# Patient Record
Sex: MALE | Race: BLACK OR AFRICAN AMERICAN | URBAN - METROPOLITAN AREA
[De-identification: names, ages, dates, MRNs, and addresses within clinical notes are randomized per-mention and may not be internally consistent; named-entity substitution may affect disease eponyms.]

---

## 2019-08-10 LAB
CREATININE, EXTERNAL: 1.44
LDL-C, EXTERNAL: 22

## 2019-08-29 ENCOUNTER — OFFICE VISIT (OUTPATIENT)
Dept: ENDOCRINOLOGY | Age: 54
End: 2019-08-29

## 2019-08-29 VITALS
OXYGEN SATURATION: 99 % | RESPIRATION RATE: 14 BRPM | HEIGHT: 66 IN | SYSTOLIC BLOOD PRESSURE: 143 MMHG | HEART RATE: 76 BPM | DIASTOLIC BLOOD PRESSURE: 81 MMHG | BODY MASS INDEX: 22.18 KG/M2 | TEMPERATURE: 96.2 F | WEIGHT: 138 LBS

## 2019-08-29 DIAGNOSIS — E11.65 TYPE 2 DIABETES MELLITUS WITH HYPERGLYCEMIA, UNSPECIFIED WHETHER LONG TERM INSULIN USE (HCC): Primary | ICD-10-CM

## 2019-08-29 DIAGNOSIS — N18.30 CHRONIC KIDNEY DISEASE (CKD), STAGE III (MODERATE) (HCC): ICD-10-CM

## 2019-08-29 DIAGNOSIS — I10 ESSENTIAL HYPERTENSION: ICD-10-CM

## 2019-08-29 DIAGNOSIS — K86.1 CHRONIC PANCREATITIS, UNSPECIFIED PANCREATITIS TYPE (HCC): ICD-10-CM

## 2019-08-29 RX ORDER — GLUCAGON 1 MG
VIAL (EA) INJECTION
Refills: 1 | COMMUNITY
Start: 2019-07-11

## 2019-08-29 RX ORDER — FLUDROCORTISONE ACETATE 0.1 MG/1
TABLET ORAL
Refills: 1 | COMMUNITY
Start: 2019-08-15

## 2019-08-29 RX ORDER — INSULIN LISPRO 100 [IU]/ML
INJECTION, SOLUTION INTRAVENOUS; SUBCUTANEOUS
Refills: 4 | COMMUNITY
Start: 2019-07-03 | End: 2019-08-29 | Stop reason: SDUPTHER

## 2019-08-29 RX ORDER — PANCRELIPASE 36000; 180000; 114000 [USP'U]/1; [USP'U]/1; [USP'U]/1
CAPSULE, DELAYED RELEASE PELLETS ORAL
Refills: 2 | COMMUNITY
Start: 2019-07-24

## 2019-08-29 RX ORDER — GABAPENTIN 300 MG/1
CAPSULE ORAL 4 TIMES DAILY
COMMUNITY
Start: 2019-08-03

## 2019-08-29 RX ORDER — INSULIN GLARGINE 100 [IU]/ML
12 INJECTION, SOLUTION SUBCUTANEOUS
COMMUNITY
Start: 2019-08-12

## 2019-08-29 RX ORDER — LANOLIN ALCOHOL/MO/W.PET/CERES
CREAM (GRAM) TOPICAL
Refills: 2 | COMMUNITY
Start: 2019-08-08

## 2019-08-29 RX ORDER — INSULIN LISPRO 100 [IU]/ML
INJECTION, SOLUTION INTRAVENOUS; SUBCUTANEOUS
Qty: 15 ML | Refills: 11 | Status: SHIPPED | OUTPATIENT
Start: 2019-08-29

## 2019-08-29 RX ORDER — CALCITRIOL 0.25 UG/1
CAPSULE ORAL
COMMUNITY
Start: 2019-08-16

## 2019-08-29 RX ORDER — SILDENAFIL 50 MG/1
TABLET, FILM COATED ORAL
Refills: 0 | COMMUNITY
Start: 2019-07-03

## 2019-08-29 RX ORDER — TAMSULOSIN HYDROCHLORIDE 0.4 MG/1
CAPSULE ORAL
Refills: 4 | COMMUNITY
Start: 2019-08-26

## 2019-08-29 RX ORDER — POTASSIUM CHLORIDE 750 MG/1
CAPSULE, EXTENDED RELEASE ORAL
Refills: 1 | COMMUNITY
Start: 2019-07-28

## 2019-08-29 RX ORDER — AMLODIPINE BESYLATE 10 MG/1
TABLET ORAL
Refills: 3 | COMMUNITY
Start: 2019-08-28

## 2019-08-29 RX ORDER — SODIUM BICARBONATE 650 MG/1
TABLET ORAL
Refills: 3 | COMMUNITY
Start: 2019-08-14

## 2019-08-29 NOTE — PATIENT INSTRUCTIONS
Check blood sugars before meals or breakfast and at bedtime. Low blood glucose is less than 70     Goal of blood glucose before meals 90 - 120 , 2  Hours after meals less than 150     Maintain the log and bring it all your appointments    If the bedtime sugars are less than 100 ,eat a 15 gm snack. Lantus or Levemir or Toujeo (long-acting insulin)12 units at bed time    If morning glucose before breakfast is more than 120 , 2 days in a row , go up by 2 units  until your morning sugar is consistently less than 120. Novolog or Humalog or Apidra insulin (fast acting) 4 units before breakfast, 6 units before lunch and 6 units before dinner. If sugars before meals are less than 70 No Humalog     Additional Novolog or Humalog or Apidra  for high blood sugars     150-200 mg   1 units   201-250 mg   2 units   251-300 mg   3 units   301-350 mg   4 units   351-400 mg   5 units         Exercising for 30 minutes at least 5 days per week has been shown to increase the lifespan of diabetics. We encourage an active lifestyle that includes regular exercise. You may benefit from the [facility] at [facility] #[phone]     For diet information go to www. [email or web address]. org     Diabetes is the leading cause of blindness in the [place] It is important that you see an eye doctor every year for a dilated retinal exam     Diabetes is the leading cause of amputations in the [place] It is very important that you keep an eye on the condition of you feet. Look for any cuts, calluses, ulcers, fungal infections, rashes, or nail problems. Diabetics need to be seen several times a year by their physician for  labs and monitoring of their diabetes. Prevention is the key to keeping diabetics out of trouble     Obtain a flu shot each Fall. Aspirin 81 mg is recommended for diabetics older than 40 years . What should you know about eating carbs?   Managing the amount of carbohydrate (carbs) you eat is an important part of healthy meals when you have diabetes. Carbohydrate is found in many foods. · Learn which foods have carbs. And learn the amounts of carbs in different foods. · Bread, cereal, pasta, and rice have about 15 grams of carbs in a serving. A serving is 1 slice of bread (1 ounce), ½ cup of cooked cereal, or 1/3 cup of cooked pasta or rice. · Fruits have 15 grams of carbs in a serving. A serving is 1 small fresh fruit, such as an apple or orange; ½ of a banana; ½ cup of cooked or canned fruit; ½ cup of fruit juice; 1 cup of melon or raspberries; or 2 tablespoons of dried fruit. · Milk and no-sugar-added yogurt have 15 grams of carbs in a serving. A serving is 1 cup of milk or 2/3 cup of no-sugar-added yogurt. · Starchy vegetables have 15 grams of carbs in a serving. A serving is ½ cup of mashed potatoes or sweet potato; 1 cup winter squash; ½ of a small baked potato; ½ cup of cooked beans; or ½ cup cooked corn or green peas. · Learn how much carbs to eat each day and at each meal. A dietitian or CDE can teach you how to keep track of the amount of carbs you eat. This is called carbohydrate counting. · If you are not sure how to count carbohydrate grams, use the Plate Method to plan meals. It is a good, quick way to make sure that you have a balanced meal. It also helps you spread carbs throughout the day. · Divide your plate by types of foods. Put non-starchy vegetables on half the plate, meat or other protein food on one-quarter of the plate, and a grain or starchy vegetable in the final quarter of the plate. To this you can add a small piece of fruit and 1 cup of milk or yogurt, depending on how many carbs you are supposed to eat at a meal.  · Try to eat about the same amount of carbs at each meal. Do not \"save up\" your daily allowance of carbs to eat at one meal.  · Proteins have very little or no carbs per serving.  Examples of proteins are beef, chicken, turkey, fish, eggs, tofu, cheese, cottage cheese, and peanut butter. A serving size of meat is 3 ounces, which is about the size of a deck of cards. Examples of meat substitute serving sizes (equal to 1 ounce of meat) are 1/4 cup of cottage cheese, 1 egg, 1 tablespoon of peanut butter, and ½ cup of tofu. How can you eat out and still eat healthy? · Learn to estimate the serving sizes of foods that have carbohydrate. If you measure food at home, it will be easier to estimate the amount in a serving of restaurant food. · If you eat more carbohydrate at a meal than you had planned, take a walk or do other exercise. This will help lower your blood sugar.

## 2019-08-29 NOTE — PROGRESS NOTES
Leslie Liang is a 46 y.o. male here for   Chief Complaint   Patient presents with    New Patient     referred by Dr. Mitch Cavanaugh for DM       Functional glucose monitor and record keeping system? - yes  Eye exam within last year? -    Foot exam within last year? - due    1. Have you been to the ER, urgent care clinic since your last visit? Hospitalized since your last visit? -n/a    2. Have you seen or consulted any other health care providers outside of the [address] [place] [address] since your last visit?   Include any pap smears or colon screening.-n/a

## 2019-08-29 NOTE — LETTER
8/29/19 Patient: Elizabeth Purcell YOB: 1965 Date of Visit: 8/29/2019 Lennox Loffler, MD 
[address] [address] VIA Facsimile: [phone] Dear Lennox Loffler, MD, Thank you for referring Mr. Baron Hein to [address] [place] [address] for evaluation. My notes for this consultation are attached. If you have questions, please do not hesitate to call me. I look forward to following your patient along with you. Sincerely, Jeremiah Joy MD

## 2019-08-29 NOTE — PROGRESS NOTES
[facility]               Wisam Sommers MD        [address] [address] JN:[phone] Fax [phone]               Patient Information  Date:8/29/2019  Name : Coco Bueno 46 y.o.     YOB: 1965         Referred by: Ino Hutchinson MD         Chief Complaint   Patient presents with   [facility] New Patient     referred by Dr. Phil Scott for DM       History of Present Illness: Coco Bueno is a 46 y.o. male here for initial visit of  Type 2 Diabetes Mellitus. Type 2 diabetes mellitus was diagnosed in 2010, history of pancreatitis likely due to pancreatic divisum, CKD, peripheral neuropathy  He is on Lantus and Humalog. He did not bring the meter, reportedly checking 4 times daily. Fastings less than 130, post lunch and dinner blood glucose is ranging from 200-250  Drinking sodas, diet drinks. Diet is high in carbs. He eats 2 times a day. Underlying CKD  No severe hypoglycemia. Very limited activity. No chest pain, blurred vision, shortness of breath  Complains of tingling, numbness in the feet    Wt Readings from Last 3 Encounters:   08/29/19 138 lb (62.6 kg)       BP Readings from Last 3 Encounters:   08/29/19 143/81           Past Medical History:   Diagnosis Date    CKD (chronic kidney disease)     [facility] mellitus) [facility]     [facility]     Pancreatic cyst      Current Outpatient Medications   Medication Sig    amLODIPine (NORVASC) 10 mg tablet TAKE 1 TABLET BY MOUTH ONCE DAILY    calcitRIOL (ROCALTROL) 0.25 mcg capsule     fludrocortisone (FLORINEF) 0.1 mg tablet TAKE 1 2 (ONE HALF) TABLET BY MOUTH ONCE DAILY    gabapentin (NEURONTIN) 300 mg capsule four (4) times daily.  GLUCAGON EMERGENCY KIT, HUMAN, 1 mg injection INJECT AS DIRECTED FOR LOW SUGAR    LANTUS SOLOSTAR U-100 INSULIN 100 unit/mL (3 mL) inpn 12 Units by SubCUTAneous route nightly.     CREON 36,000-114,000- 180,000 unit cpDR capsule TAKE 1 CAPSULE BY MOUTH THREE TIMES DAILY    magnesium oxide (MAG-OX) 400 mg tablet TAKE 1 TABLET BY MOUTH ONCE DAILY AS NEEDED    potassium chloride SA (MICRO-K) 10 mEq capsule TAKE 1 CAPSULE BY MOUTH TWICE DAILY    sildenafil citrate (VIAGRA) 50 mg tablet TAKE ONE TABLET BY MOUTH APPROXIMATELY ONE HOUR BEFORE SEXUAL ACTIVITY. DO NOT USE MORE THAN ONE DOSE DAILY DO NOT TAKE WITHIN 6 HOURS OF TA    sodium bicarbonate 650 mg tablet TAKE 2 TABLETS BY MOUTH THREE TIMES DAILY    tamsulosin (FLOMAX) 0.4 mg capsule TAKE 1 CAPSULE BY MOUTH ONCE DAILY AT BEDTIME    HUMALOG KWIKPEN INSULIN 100 unit/mL kwikpen Inject 4 units before breakfast and 6 units before lunch and dinner w/ SSI Max units daily: 31     No current facility-administered medications for this visit. Allergies   Allergen Reactions    Bee Venom Protein (Honey Bee) Anaphylaxis       Review of Systems:  All 10 systems reviewed and are negative other than mentioned in HPI    Physical Examination:   Blood pressure 143/81, pulse 76, temperature 96.2 °F (35.7 °C), temperature source Oral, resp. rate 14, height 5' 6\" (1.676 m), weight 138 lb (62.6 kg), SpO2 99 %. Estimated body mass index is 22.27 kg/m² as calculated from the following:    Height as of this encounter: 5' 6\" (1.676 m). -   Weight as of this encounter: 138 lb (62.6 kg).   - General: pleasant, no distress, good eye contact  - HEENT: no pallor, no periorbital edema, EOMI  - Neck: supple, no thyromegaly, no nodules  - Cardiovascular: regular,  normal S1 and S2, no murmurs  - Respiratory: clear to auscultation bilaterally  - Gastrointestinal: soft, nontender, nondistended,  BS +  - Musculoskeletal: no proximal muscle weakness in upper or lower extremities  - Integumentary: no acanthosis nigricans,no edema,   - Neurological: alert and oriented  - Psychiatric: normal mood and affect  - Skin: color, texture, turgor normal.     Diabetic foot exam: August 2019    Left: Vibratory sensation absent   Filament test decreased sensation with micro filament   Pulse DP: 1+    Deformities: Bunion  Right:    Vibratory sensation absent   Filament test decreased sensation with micro filament   Pulse DP: 1+   Deformities: Bunion        Data Reviewed:       No results found for: HBA1C, SHK2VOQX, HGBE8, GLU, GESTF, GLUCPOC, MCACR, MCA1, MCA2, MCA3, MCAU, LDL, LDLC, DLDLP, RUBY, CREAPOC, ACREA, CREA, REFC3, REFC4, BJW3NJLI, ZCL5TRRD   No results found for: GFRNA, GFRNAPOC, GFRAA, GFRAAPOC, CREA, CREAPOC, BUN, IBUN, BUNPOC, NA, NAPOC, K, KPOCT, CL, CLPOC, CO2, CO2POC, MG, PHOS, ALBEU, PTH, PTHILT, EPO    Assessment/Plan:     1. Type 2 diabetes mellitus with hyperglycemia, unspecified whether long term insulin use ([email or web address]    2. Chronic pancreatitis, unspecified pancreatitis type ([email or web address]    3. Chronic kidney disease (CKD), stage III (moderate) ([facility])    4. Essential hypertension        1. Diabetes Mellitus with  nephropathy,neuropathy  No results found for: HBA1C, HGBE8, ZAD8PGGZ, PER5LQRL, YBD6SJHC   Does not have typical phenotype of type 2 diabetes, pancreatic insufficiency due to pancreatitis  Uncontrolled diabetes mellitus, postprandial hyperglycemia. Discussed about low-carb diet, decrease soft drinks,   Increase activity as tolerated  Continue Lantus  Humalog 4 units before breakfast, 6 units before lunch, 6 units before dinner  Given history of pancreatitis, CKD, limited options   advised to check glucose 2 - 4 times daily    Diabetic issues reviewed : glycemic goals , written exchange diet given, low carbohydrate diet, weight control , home glucose monitoring emphasized,  hypoglycemia management and long term diabetic complications discussed. FLU annually ,Pneumovax ,aspirin daily,annual eye exam,microalbumin    2. HTN : Continue current therapy     3. Reports history of MI, discussed the benefits of statin  Check lipid panel    4. Chronic pancreatitis    5.   Peripheral neuropathy: Continue gabapentin  Stressed the importance of good glycemic control    6. CKD:      Patient Instructions   Check blood sugars before meals or breakfast and at bedtime. Low blood glucose is less than 70     Goal of blood glucose before meals 90 - 120 , 2  Hours after meals less than 150     Maintain the log and bring it all your appointments    If the bedtime sugars are less than 100 ,eat a 15 gm snack. Lantus or Levemir or Toujeo (long-acting insulin)12 units at bed time    If morning glucose before breakfast is more than 120 , 2 days in a row , go up by 2 units  until your morning sugar is consistently less than 120. Novolog or Humalog or Apidra insulin (fast acting) 4 units before breakfast, 6 units before lunch and 6 units before dinner. If sugars before meals are less than 70 No Humalog     Additional Novolog or Humalog or Apidra  for high blood sugars     150-200 mg   1 units   201-250 mg   2 units   251-300 mg   3 units   301-350 mg   4 units   351-400 mg   5 units         Exercising for 30 minutes at least 5 days per week has been shown to increase the lifespan of diabetics. We encourage an active lifestyle that includes regular exercise. You may benefit from the Di[facility] Center at [facility] #[phone]     For diet information go to www. [email or web address] org     Diabetes is the leading cause of blindness in the [place] It is important that you see an eye doctor every year for a dilated retinal exam     Diabetes is the leading cause of amputations in the [place] It is very important that you keep an eye on the condition of you feet. Look for any cuts, calluses, ulcers, fungal infections, rashes, or nail problems. Diabetics need to be seen several times a year by their physician for  labs and monitoring of their diabetes. Prevention is the key to keeping diabetics out of trouble     Obtain a flu shot each Fall. Aspirin 81 mg is recommended for diabetics older than 40 years .       What should you know about eating carbs? Managing the amount of carbohydrate (carbs) you eat is an important part of healthy meals when you have diabetes. Carbohydrate is found in many foods. · Learn which foods have carbs. And learn the amounts of carbs in different foods. · Bread, cereal, pasta, and rice have about 15 grams of carbs in a serving. A serving is 1 slice of bread (1 ounce), ½ cup of cooked cereal, or 1/3 cup of cooked pasta or rice. · Fruits have 15 grams of carbs in a serving. A serving is 1 small fresh fruit, such as an apple or orange; ½ of a banana; ½ cup of cooked or canned fruit; ½ cup of fruit juice; 1 cup of melon or raspberries; or 2 tablespoons of dried fruit. · Milk and no-sugar-added yogurt have 15 grams of carbs in a serving. A serving is 1 cup of milk or 2/3 cup of no-sugar-added yogurt. · Starchy vegetables have 15 grams of carbs in a serving. A serving is ½ cup of mashed potatoes or sweet potato; 1 cup winter squash; ½ of a small baked potato; ½ cup of cooked beans; or ½ cup cooked corn or green peas. · Learn how much carbs to eat each day and at each meal. A dietitian or CDE can teach you how to keep track of the amount of carbs you eat. This is called carbohydrate counting. · If you are not sure how to count carbohydrate grams, use the Plate Method to plan meals. It is a good, quick way to make sure that you have a balanced meal. It also helps you spread carbs throughout the day. · Divide your plate by types of foods. Put non-starchy vegetables on half the plate, meat or other protein food on one-quarter of the plate, and a grain or starchy vegetable in the final quarter of the plate.  To this you can add a small piece of fruit and 1 cup of milk or yogurt, depending on how many carbs you are supposed to eat at a meal.  · Try to eat about the same amount of carbs at each meal. Do not \"save up\" your daily allowance of carbs to eat at one meal.  · Proteins have very little or no carbs per serving. Examples of proteins are beef, chicken, turkey, fish, eggs, tofu, cheese, cottage cheese, and peanut butter. A serving size of meat is 3 ounces, which is about the size of a deck of cards. Examples of meat substitute serving sizes (equal to 1 ounce of meat) are 1/4 cup of cottage cheese, 1 egg, 1 tablespoon of peanut butter, and ½ cup of tofu. How can you eat out and still eat healthy? · Learn to estimate the serving sizes of foods that have carbohydrate. If you measure food at home, it will be easier to estimate the amount in a serving of restaurant food. · If you eat more carbohydrate at a meal than you had planned, take a walk or do other exercise. This will help lower your blood sugar. Follow-up and Dispositions    · Return in about 4 months (around 12/29/2019) for labs before next visit and follow up. Thank you for allowing me to participate in the care of this patient. Latoya Mccoy MD      Patient verbalized understanding     Voice-recognition software was used to generate this report, which may result in some phonetic-based errors in the grammar and contents. Even though attempts were made to correct all the mistakes, some may have been missed and remained in the body of the report.

## 2019-08-30 LAB
ALBUMIN/CREAT UR: 11.1 MG/G CREAT (ref 0–30)
BUN SERPL-MCNC: 19 MG/DL (ref 6–24)
BUN/CREAT SERPL: 11 (ref 9–20)
C PEPTIDE SERPL-MCNC: 0.7 NG/ML (ref 1.1–4.4)
CALCIUM SERPL-MCNC: 9.2 MG/DL (ref 8.7–10.2)
CHLORIDE SERPL-SCNC: 105 MMOL/L (ref 96–106)
CO2 SERPL-SCNC: 19 MMOL/L (ref 20–29)
CREAT SERPL-MCNC: 1.79 MG/DL (ref 0.76–1.27)
CREAT UR-MCNC: 38.6 MG/DL
EST. AVERAGE GLUCOSE BLD GHB EST-MCNC: 298 MG/DL
GLUCOSE SERPL-MCNC: 500 MG/DL (ref 65–99)
HBA1C MFR BLD: 12 % (ref 4.8–5.6)
INTERPRETATION: NORMAL
LDLC SERPL DIRECT ASSAY-MCNC: 50 MG/DL (ref 0–99)
Lab: NORMAL
MICROALBUMIN UR-MCNC: 4.3 UG/ML
POTASSIUM SERPL-SCNC: 4.8 MMOL/L (ref 3.5–5.2)
SODIUM SERPL-SCNC: 138 MMOL/L (ref 134–144)

## 2019-09-03 LAB — CREATININE, EXTERNAL: 1.53

## 2019-09-03 NOTE — PROGRESS NOTES
He is scheduled under the wrong provider, please change it. I would like to see him in 6 to 8 weeks as his blood glucose is very high rather than in December.

## 2020-05-26 ENCOUNTER — VIRTUAL VISIT (OUTPATIENT)
Dept: ENDOCRINOLOGY | Age: 55
End: 2020-05-26

## 2020-05-26 DIAGNOSIS — N18.30 CHRONIC KIDNEY DISEASE (CKD), STAGE III (MODERATE) (HCC): ICD-10-CM

## 2020-05-26 DIAGNOSIS — E10.65 HYPERGLYCEMIA DUE TO TYPE 1 DIABETES MELLITUS (HCC): Primary | ICD-10-CM

## 2020-05-26 DIAGNOSIS — I10 ESSENTIAL HYPERTENSION: ICD-10-CM

## 2020-05-26 DIAGNOSIS — K86.1 CHRONIC PANCREATITIS, UNSPECIFIED PANCREATITIS TYPE (HCC): ICD-10-CM

## 2020-05-26 NOTE — Clinical Note
Lynsey -tandem pump with control IQ, type 1 diabetes, he lives in [place], He is interested in the pump, let her know

## 2020-05-27 NOTE — PROGRESS NOTES
Surinder Rey MD         Patient Information  Date:5/28/2020  Name : Tomer Grant 53 y.o.     YOB: 1965         Referred by: Brenda Dent MD         Chief Complaint   Patient presents with    Diabetes     Pursuant to the emergency declaration under the [facility] [place] [address] [facility] authority and the [employer] Act, this Virtual  Visit was conducted, with patient's consent, to reduce the patient's risk of exposure to COVID-19 . Patient  is aware that this is a billable encounter and is responsible for copays/deductibles       Services were provided through a telephone discussion virtually to substitute for in-person clinic visit. Attempted to do video visit multiple times, could not connect with his phone    Place of service: Provider : Office  Patient: Home  History of Present Illness: Tomer Grant is a 53 y.o. male here  Diabetes mellitus was diagnosed in 2010, history of pancreatitis likely due to pancreatic divisum, CKD, peripheral neuropathy  He is on Lantus and Humalog. He was once seen in August 2019, did not follow-up  Missed appointment  He is checking the blood glucose 6-8 times    Fasting 108 ,  ,156 ,,169 , 185 - , 151  -    Lunch 115 , pp 240 , 284 ,, 219  , 139 -, 122 -     Dinner 146 , 151- , 257 - 285, 156 - , 117 -       Bedtime 125     Is drinking diet drinks, no nausea, vomiting  Has underlying CKD  Very limited activity.   No fever, cough  No chest pain, blurred vision, shortness of breath  Complains of tingling, numbness in the feet    Wt Readings from Last 3 Encounters:   08/29/19 138 lb (62.6 kg)       BP Readings from Last 3 Encounters:   08/29/19 143/81           Past Medical History:   Diagnosis Date    CKD (chronic kidney disease)     [facility]DM [facility] mellitus) [facility]     MI (myocardial infarction) [facility]     Pancreatic cyst      Current Outpatient Medications   Medication Sig    amLODIPine (NORVASC) 10 mg tablet TAKE 1 TABLET BY MOUTH ONCE DAILY    calcitRIOL (ROCALTROL) 0.25 mcg capsule     fludrocortisone (FLORINEF) 0.1 mg tablet TAKE 1 2 (ONE HALF) TABLET BY MOUTH ONCE DAILY    gabapentin (NEURONTIN) 300 mg capsule four (4) times daily.  GLUCAGON EMERGENCY KIT, HUMAN, 1 mg injection INJECT AS DIRECTED FOR LOW SUGAR    LANTUS SOLOSTAR U-100 INSULIN 100 unit/mL (3 mL) inpn 12 Units by SubCUTAneous route nightly.  CREON 36,000-114,000- 180,000 unit cpDR capsule TAKE 1 CAPSULE BY MOUTH THREE TIMES DAILY    magnesium oxide (MAG-OX) 400 mg tablet TAKE 1 TABLET BY MOUTH ONCE DAILY AS NEEDED    potassium chloride SA (MICRO-K) 10 mEq capsule TAKE 1 CAPSULE BY MOUTH TWICE DAILY    sildenafil citrate (VIAGRA) 50 mg tablet TAKE ONE TABLET BY MOUTH APPROXIMATELY ONE HOUR BEFORE SEXUAL ACTIVITY. DO NOT USE MORE THAN ONE DOSE DAILY DO NOT TAKE WITHIN 6 HOURS OF TA    sodium bicarbonate 650 mg tablet TAKE 2 TABLETS BY MOUTH THREE TIMES DAILY    tamsulosin (FLOMAX) 0.4 mg capsule TAKE 1 CAPSULE BY MOUTH ONCE DAILY AT BEDTIME    HUMALOG KWIKPEN INSULIN 100 unit/mL kwikpen Inject 4 units before breakfast and 6 units before lunch and dinner w/ SSI Max units daily: 31     No current facility-administered medications for this visit. Allergies   Allergen Reactions    Bee Venom Protein (Honey Bee) Anaphylaxis       Review of Systems:  All 10 systems reviewed and are negative other than mentioned in HPI    Physical Examination:   There were no vitals taken for this visit. Estimated body mass index is 22.27 kg/m² as calculated from the following:    Height as of 8/29/19: 5' 6\" (1.676 m). -   Weight as of 8/29/19: 138 lb (62.6 kg).   -     Diabetic foot exam: August 2019    Left:     Vibratory sensation absent   Filament test decreased sensation with micro filament   Pulse DP: 1+    Deformities: Bunion  Right:    Vibratory sensation absent   Filament test decreased sensation with micro filament   Pulse DP: 1+   Deformities: Bunion        Data Reviewed:       Lab Results   Component Value Date/Time    Hemoglobin A1c 12.0 (H) 08/29/2019 03:00 PM    Glucose 500 (H) 08/29/2019 03:00 PM    Microalb/Creat ratio (ug/mg creat.) 11.1 08/29/2019 03:00 PM    LDL,Direct 50 08/29/2019 03:00 PM    Creatinine 1.79 (H) 08/29/2019 03:00 PM      Lab Results   Component Value Date/Time    GFR est non-AA 42 (L) 08/29/2019 03:00 PM    GFR est AA 49 (L) 08/29/2019 03:00 PM    Creatinine 1.79 (H) 08/29/2019 03:00 PM    BUN 19 08/29/2019 03:00 PM    Sodium 138 08/29/2019 03:00 PM    Potassium 4.8 08/29/2019 03:00 PM    Chloride 105 08/29/2019 03:00 PM    CO2 19 (L) 08/29/2019 03:00 PM       Assessment/Plan:     1. Hyperglycemia due to type 1 diabetes mellitus ([address]    2. Essential hypertension    3. Chronic pancreatitis, unspecified pancreatitis type ([facility] [address]    4. Chronic kidney disease (CKD), stage III (moderate) ([facility])        1. Type I diabetes Mellitus with  nephropathy,neuropathy  Lab Results   Component Value Date/Time    Hemoglobin A1c 12.0 (H) 08/29/2019 03:00 PM      With a blood glucose of 500, C-peptide was less than 1, diabetes due to pancreatic insufficiency  We will treat like type 1 diabetes mellitus  Discussed about the pump, CGM, risk of hypoglycemia  Postprandial hyperglycemia typical of pancreatic insufficiency plus dietary indiscretion  Decrease soft drinks    Continue Lantus  Humalog he is taking 5 units before each meal  Insulin pump education, carb counting  Given history of pancreatitis, CKD, limited options  Continue checking the blood glucose  Patient is checking the blood glucose 4 times a day. Patient is taking insulin 3 or more times per day  Patient is adjusting the insulin based on the blood glucose checks.   Patient is at risk for hypoglycemia. I recommend continuous glucose monitor to help manage the diabetes better    Diabetic issues reviewed : glycemic goals , written exchange diet given, low carbohydrate diet, weight control , home glucose monitoring emphasized,  hypoglycemia management and long term diabetic complications discussed. FLU annually ,Pneumovax ,aspirin daily,annual eye exam,microalbumin    2. HTN : Continue current therapy     3. CKD: Followed by nephrology    4. Chronic pancreatitis    5. Peripheral neuropathy: Continue gabapentin  Stressed the importance of good glycemic control    6. Exocrine pancreatic insufficiency: Creon      There are no Patient Instructions on file for this visit. Follow-up and Dispositions    · Return in about 3 months (around 8/26/2020). Spent 30 minutes on the phone    Thank you for allowing me to participate in the care of this patient. Jovi Porras MD      Patient verbalized understanding     Voice-recognition software was used to generate this report, which may result in some phonetic-based errors in the grammar and contents. Even though attempts were made to correct all the mistakes, some may have been missed and remained in the body of the report.

## 2022-03-19 PROBLEM — E11.65 TYPE 2 DIABETES MELLITUS WITH HYPERGLYCEMIA (HCC): Status: ACTIVE | Noted: 2019-08-29

## 2022-03-19 PROBLEM — N18.30 CHRONIC KIDNEY DISEASE (CKD), STAGE III (MODERATE) (HCC): Status: ACTIVE | Noted: 2019-08-29

## 2022-03-19 PROBLEM — I10 ESSENTIAL HYPERTENSION: Status: ACTIVE | Noted: 2019-08-29

## 2022-03-20 PROBLEM — K86.1 CHRONIC PANCREATITIS (HCC): Status: ACTIVE | Noted: 2019-08-29

## 2023-05-20 RX ORDER — GABAPENTIN 300 MG/1
CAPSULE ORAL 4 TIMES DAILY
COMMUNITY
Start: 2019-08-03

## 2023-05-20 RX ORDER — MAGNESIUM OXIDE 400 MG/1
TABLET ORAL
COMMUNITY
Start: 2019-08-08

## 2023-05-20 RX ORDER — SILDENAFIL 50 MG/1
TABLET, FILM COATED ORAL
COMMUNITY
Start: 2019-07-03

## 2023-05-20 RX ORDER — CALCITRIOL 0.25 UG/1
CAPSULE, LIQUID FILLED ORAL
COMMUNITY
Start: 2019-08-16

## 2023-05-20 RX ORDER — POTASSIUM CHLORIDE 750 MG/1
1 CAPSULE, EXTENDED RELEASE ORAL 2 TIMES DAILY
COMMUNITY
Start: 2019-07-28

## 2023-05-20 RX ORDER — PANCRELIPASE 36000; 180000; 114000 [USP'U]/1; [USP'U]/1; [USP'U]/1
1 CAPSULE, DELAYED RELEASE PELLETS ORAL 3 TIMES DAILY
COMMUNITY
Start: 2019-07-24

## 2023-05-20 RX ORDER — TAMSULOSIN HYDROCHLORIDE 0.4 MG/1
CAPSULE ORAL
COMMUNITY
Start: 2019-08-26

## 2023-05-20 RX ORDER — AMLODIPINE BESYLATE 10 MG/1
1 TABLET ORAL DAILY
COMMUNITY
Start: 2019-08-28

## 2023-05-20 RX ORDER — SODIUM BICARBONATE 650 MG/1
TABLET ORAL
COMMUNITY
Start: 2019-08-14

## 2023-05-20 RX ORDER — INSULIN LISPRO 100 [IU]/ML
INJECTION, SOLUTION INTRAVENOUS; SUBCUTANEOUS
COMMUNITY
Start: 2019-08-29

## 2023-05-20 RX ORDER — FLUDROCORTISONE ACETATE 0.1 MG/1
TABLET ORAL
COMMUNITY
Start: 2019-08-15

## 2023-05-20 RX ORDER — INSULIN GLARGINE 100 [IU]/ML
12 INJECTION, SOLUTION SUBCUTANEOUS
COMMUNITY
Start: 2019-08-12